# Patient Record
Sex: MALE | Race: WHITE
[De-identification: names, ages, dates, MRNs, and addresses within clinical notes are randomized per-mention and may not be internally consistent; named-entity substitution may affect disease eponyms.]

---

## 2021-12-22 ENCOUNTER — HOSPITAL ENCOUNTER (EMERGENCY)
Dept: HOSPITAL 47 - EC | Age: 66
Discharge: HOME | End: 2021-12-22
Payer: MEDICARE

## 2021-12-22 VITALS
TEMPERATURE: 98.3 F | SYSTOLIC BLOOD PRESSURE: 154 MMHG | HEART RATE: 91 BPM | RESPIRATION RATE: 18 BRPM | DIASTOLIC BLOOD PRESSURE: 86 MMHG

## 2021-12-22 DIAGNOSIS — Z88.8: ICD-10-CM

## 2021-12-22 DIAGNOSIS — Z20.822: Primary | ICD-10-CM

## 2021-12-22 PROCEDURE — 87635 SARS-COV-2 COVID-19 AMP PRB: CPT

## 2021-12-22 PROCEDURE — 99283 EMERGENCY DEPT VISIT LOW MDM: CPT

## 2021-12-22 NOTE — ED
Recheck HPI





- General


Chief Complaint: Recheck/Abnormal Lab/Rx


Stated Complaint: covid exposure


Time Seen by Provider: 12/22/21 09:14


Source: patient, RN notes reviewed


Mode of arrival: ambulatory


Limitations: no limitations





- History of Present Illness


Initial Comments: 





66-year-old male presents emergency department for COVID-19 testing.  Patient 

states he is asymptomatic patient's significant other  is symptomatic and he is 

concerned.  No fevers chills no cough or cold like symptoms or complaints.





- Related Data


                                Home Medications











 Medication  Instructions  Recorded  Confirmed


 


Calcium Carbonate [Tums] 500 mg PO AS DIRECTED PRN 11/16/15 05/29/16


 


Multivitamins, Thera [Theragran] 1 each PO DAILY 11/16/15 05/29/16


 


Simvastatin [Zocor] 40 mg PO HS 11/16/15 05/29/16











                                    Allergies











Allergy/AdvReac Type Severity Reaction Status Date / Time


 


atorvastatin calcium Allergy Severe Rapid Verified 12/22/21 09:17





[From Lipitor]   Heart  





   Rate,  





   Sweating.  


 


rosuvastatin calcium Allergy Severe Rapid Verified 12/22/21 09:17





[From Crestor]   Heart  





   Rate,  





   Sweating  


 


diphenhydramine HCl AdvReac Unknown Very Sleepy Verified 12/22/21 09:17





[From Benadryl]     


 


terfenadine [From Seldane] AdvReac Unknown Very Sleepy Verified 12/22/21 09:17














Review of Systems


ROS Statement: 


Those systems with pertinent positive or pertinent negative responses have been 

documented in the HPI.





ROS Other: All systems not noted in ROS Statement are negative.





Past Medical History


Past Medical History: GERD/Reflux


Additional Past Medical History / Comment(s): MITRAL VALVE PROLAPSE, HEART 

MURMUR, HEMORRHOIDS.


History of Any Multi-Drug Resistant Organisms: None Reported


Past Surgical History: No Surgical Hx Reported


Additional Past Surgical History / Comment(s): WISDOM TEETH REMOVED


Additional Past Anesthesia/Blood Transfusion Reaction / Comment(s): NO HX OF 

ANESTHESIA.


Past Psychological History: No Psychological Hx Reported


Smoking Status: Never smoker


Past Alcohol Use History: Rare


Past Drug Use History: None Reported





- Past Family History


  ** Mother


Family Medical History: No Reported History





General Exam


Limitations: no limitations


General appearance: alert, in no apparent distress


Head exam: Present: atraumatic, normocephalic, normal inspection


Eye exam: Present: normal appearance, PERRL, EOMI.  Absent: scleral icterus, 

conjunctival injection, periorbital swelling


ENT exam: Present: normal exam, mucous membranes moist


Neck exam: Present: normal inspection, full ROM.  Absent: tenderness, 

meningismus, lymphadenopathy


Respiratory exam: Present: normal lung sounds bilaterally.  Absent: respiratory 

distress, wheezes, rales, rhonchi, stridor


Cardiovascular Exam: Present: regular rate, normal rhythm, normal heart sounds. 

Absent: systolic murmur, diastolic murmur, rubs, gallop, clicks


Neurological exam: Present: alert


Skin exam: Present: warm, dry, intact, normal color.  Absent: rash





Course


                                   Vital Signs











  12/22/21





  09:18


 


Temperature 98.3 F


 


Pulse Rate 91


 


Respiratory 18





Rate 


 


Blood Pressure 154/86


 


O2 Sat by Pulse 100





Oximetry 














Medical Decision Making





- Medical Decision Making





Negative covid 19 testing.  Patient discharged in stable condition.





- Lab Data


                                   Lab Results











  12/22/21 Range/Units





  09:21 


 


Coronavirus (PCR)  Not Detected  (Not Detectd)  














Disposition


Clinical Impression: 


 Encounter for laboratory testing for COVID-19 virus





Disposition: HOME SELF-CARE


Condition: Stable


Additional Instructions: 


Please return to the Emergency Department if symptoms worsen or any other 

concerns.


Is patient prescribed a controlled substance at d/c from ED?: No


Referrals: 


Caitlyn Owen MD [Primary Care Provider] - 1-2 days


Time of Disposition: 10:07